# Patient Record
Sex: MALE | Race: WHITE | NOT HISPANIC OR LATINO
[De-identification: names, ages, dates, MRNs, and addresses within clinical notes are randomized per-mention and may not be internally consistent; named-entity substitution may affect disease eponyms.]

---

## 2021-07-12 ENCOUNTER — NON-APPOINTMENT (OUTPATIENT)
Age: 66
End: 2021-07-12

## 2021-07-13 PROBLEM — Z00.00 ENCOUNTER FOR PREVENTIVE HEALTH EXAMINATION: Status: ACTIVE | Noted: 2021-07-13

## 2021-07-16 ENCOUNTER — APPOINTMENT (OUTPATIENT)
Dept: COLORECTAL SURGERY | Facility: CLINIC | Age: 66
End: 2021-07-16
Payer: MEDICARE

## 2021-07-16 ENCOUNTER — NON-APPOINTMENT (OUTPATIENT)
Age: 66
End: 2021-07-16

## 2021-07-16 DIAGNOSIS — Z86.39 PERSONAL HISTORY OF OTHER ENDOCRINE, NUTRITIONAL AND METABOLIC DISEASE: ICD-10-CM

## 2021-07-16 DIAGNOSIS — Z86.79 PERSONAL HISTORY OF OTHER DISEASES OF THE CIRCULATORY SYSTEM: ICD-10-CM

## 2021-07-16 DIAGNOSIS — Z85.038 PERSONAL HISTORY OF OTHER MALIGNANT NEOPLASM OF LARGE INTESTINE: ICD-10-CM

## 2021-07-16 DIAGNOSIS — C78.6 SECONDARY MALIGNANT NEOPLASM OF RETROPERITONEUM AND PERITONEUM: ICD-10-CM

## 2021-07-16 DIAGNOSIS — C18.9 MALIGNANT NEOPLASM OF COLON, UNSPECIFIED: ICD-10-CM

## 2021-07-16 DIAGNOSIS — D69.59 OTHER SECONDARY THROMBOCYTOPENIA: ICD-10-CM

## 2021-07-16 PROCEDURE — 99205 OFFICE O/P NEW HI 60 MIN: CPT | Mod: 95

## 2021-07-16 RX ORDER — CETIRIZINE HCL 10 MG
10 TABLET ORAL
Refills: 0 | Status: ACTIVE | COMMUNITY

## 2021-07-16 RX ORDER — METFORMIN HYDROCHLORIDE 500 MG/1
500 TABLET, COATED ORAL
Refills: 0 | Status: ACTIVE | COMMUNITY

## 2021-07-16 RX ORDER — ROSUVASTATIN CALCIUM 10 MG/1
10 TABLET, FILM COATED ORAL
Refills: 0 | Status: ACTIVE | COMMUNITY

## 2021-07-16 RX ORDER — PROPRANOLOL HCL 80 MG
80 CAPSULE, EXTENDED RELEASE 24HR ORAL
Refills: 0 | Status: ACTIVE | COMMUNITY

## 2021-07-16 RX ORDER — DICYCLOMINE HYDROCHLORIDE 10 MG/1
10 CAPSULE ORAL
Refills: 0 | Status: ACTIVE | COMMUNITY

## 2021-07-16 RX ORDER — GLIMEPIRIDE 1 MG/1
1 TABLET ORAL
Refills: 0 | Status: ACTIVE | COMMUNITY

## 2021-07-16 RX ORDER — ESOMEPRAZOLE MAGNESIUM 40 MG/1
CAPSULE, DELAYED RELEASE ORAL
Refills: 0 | Status: ACTIVE | COMMUNITY

## 2021-07-16 RX ORDER — LOSARTAN POTASSIUM 50 MG/1
50 TABLET, FILM COATED ORAL
Refills: 0 | Status: ACTIVE | COMMUNITY

## 2021-07-16 NOTE — HISTORY OF PRESENT ILLNESS
[FreeTextEntry1] : Televisit:\par Sofia Alvarado, Medical oncology ([facility])\par \par 67 yo man diagnosed in 2016 as having a sigmoid lesion. \par Surgery 1: 11/2016  Robotic LAR done at which time they noted ? peritoneal mets.\par Got chemo 5 FU, oxaliplatin, leuko  2/1/17-7/21/17\par Surgery 2: August 31, 2017 (end summer) had CRS / HIPEC (Dr. S[facility]).  Omentectomy done at that point.\par Capecitbine x 6 months and IV chemo as well. Was on chemo the whole time. \par Rising CEA led to colonoscopy\par In November 2018 has ? cecal tumor confirmed cecal adenocarcinoma\par January 2019, had R hemicolecotmy. 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  Was stopped 2 months ago. (missed a few Rx for bone marrow issues).    Progression of disease noted at end of March 2021. \par \par Most recent chemo Rx: avastin and capecitabne x 1 cycle only(May 2021).  Thrombocytopenia led to discontinuation of this regimen.  [facility] [place] that no more chemo can be offered.  Latest platelet count 50 K.  has not rebounded.  They are not offering more chemo.\par \par PET/CT scan in May 2021 showed ? small liver lesion (FDG avid).  Led to MRI  May 2021.  Slightly increased splenomegatly noted (? related to Portal hypertension)  unchanged GB.  No comments on liver.\par \par Another MRI done 5/22/21.  No evidence of hepatic metastatic dx.  No lesion in area of FDG uptake on prior PET.  \par  \par Most recent scans are from May 22, 2021.  Overall lesion.  Stable disease.  CT scan 5/22/21 images viewed shows ascites, mild (my opinion). Presumed liver cysts seen as well.  LAR and ileocolic staple lines seen. No obvious peritoneal disease seen with my eyes (but some was noted by rediologist).\par \par Functionally NOW:  Doing pretty well.  Is tired but still functioning and doing housework.  Mows the lawn, walks the walker.\par \par Eating well. Weight stable.  Has 2-12 BM/day.  Consistency varies from log shape to sludgy mateial and rarely watery.  85% of days has up to a dozen BM's per day.   [Was worse on chemo].  \par No N/V. No bowel obstructions. No ascites.\par \par [facility] [place]: says there is no more chemo for him to take.\par  \par \par PMH:\par cardiac: + HTN, no MI, angina, palpiations\par pulm: never smoked.  No asthma, bronchitis, cough, PNA\par + DM x 4 years (oral meds only).  Rarely checks finger sticks\par GI: no hepatitis, PUD, gastritis\par : + nocturia x 1-2/night.   no stones, no hematuria, dysuria. \par No TIA, Seizure, CVA.\par No bleeding issues or healing issues. \par No thyroid issues.  \par \par

## 2021-07-16 NOTE — ASSESSMENT
[FreeTextEntry1] : Colon cancer with total of 3 operations and at least 3 chemo regimens.  Had CRS HIPEC and LAR and R colectomy.  \par \par As judged by imaging studies, he has stable disease and little ascites.  GI function and overall performance level is good.  Off all chemo not.\par \par No more systemic chemo is thought to be an option because of chronic thrombocytopenia (x over 1 year).  Held a bunch of treatments because of his counts.\par This is the chemo limiting finding.  Counts have not rebounded.  This will be a problem for [facility] as well.  \par \par Getting blood tests every 4 weeks.  Sees oncologist monthly. Clinically, had some rectal bleeding noted (that was the presenting symptom).  Portal hypertension may be cause (of low platelets),   No meds have been advised to treat this problem. \par \par Getting CT scans periodically.  Not aware of what the interval will be.  had been every third month. \par \par We will keep in touch with patient\par \par Concern for possible adhesions in the abdomen preventing access to the abdomen laparoscopically.  \par IV chemo given after 1st cycle as preop treatment.  Would that cause worse low platelet count.    \par \par Need to settle moving forward.\par 1) how often will you get imaging and also every 2 weeks or monthly.\par 2) repeat platelet account.\par 3) Geraldo to find out if platelet requirement could be dropped and patient enrolled.\par Patient to stay in close touch with [facility] team. \par \par \par length of call: 1 hr 11 minutes.\par \par Would do a laparoscopy to check the disease level and then, if it meets criteria, the [facility] enrolled is option\par

## 2021-07-19 ENCOUNTER — TRANSCRIPTION ENCOUNTER (OUTPATIENT)
Age: 66
End: 2021-07-19

## 2021-10-05 ENCOUNTER — NON-APPOINTMENT (OUTPATIENT)
Age: 66
End: 2021-10-05

## 2021-10-05 ENCOUNTER — APPOINTMENT (OUTPATIENT)
Dept: COLORECTAL SURGERY | Facility: CLINIC | Age: 66
End: 2021-10-05
Payer: MEDICARE

## 2021-10-05 PROCEDURE — 99442: CPT | Mod: 95
